# Patient Record
Sex: FEMALE | Race: OTHER | HISPANIC OR LATINO | URBAN - METROPOLITAN AREA
[De-identification: names, ages, dates, MRNs, and addresses within clinical notes are randomized per-mention and may not be internally consistent; named-entity substitution may affect disease eponyms.]

---

## 2017-07-08 ENCOUNTER — EMERGENCY (EMERGENCY)
Facility: HOSPITAL | Age: 22
LOS: 1 days | Discharge: HOME | End: 2017-07-08
Attending: EMERGENCY MEDICINE | Admitting: EMERGENCY MEDICINE
Payer: SELF-PAY

## 2017-07-08 VITALS
RESPIRATION RATE: 18 BRPM | TEMPERATURE: 98 F | OXYGEN SATURATION: 98 % | DIASTOLIC BLOOD PRESSURE: 78 MMHG | HEART RATE: 85 BPM | SYSTOLIC BLOOD PRESSURE: 111 MMHG

## 2017-07-08 PROCEDURE — 99282 EMERGENCY DEPT VISIT SF MDM: CPT | Mod: 25

## 2017-07-08 PROCEDURE — 12001 RPR S/N/AX/GEN/TRNK 2.5CM/<: CPT

## 2017-07-08 PROCEDURE — 99283 EMERGENCY DEPT VISIT LOW MDM: CPT | Mod: 25

## 2017-07-08 NOTE — ED PROVIDER NOTE - OBJECTIVE STATEMENT
The patient reports that today she was at a wedding when a chandalier fell, and she subsequently fell upon some of the broken glass. At that time she felt no pain, but reports that after she stood up she realized she was bleeding down her left leg. She went to clean herself in a restroom and noticed that she had a laceration of her left buttock. She was able to get the bleeding to stop, but notes that friends of hers at the wedding had activated EMS and due to concern for worsened injury she was brought here to the hospital. Here in the ED she denies having pain, noting she is mostly embarrassed and concerned that she "may need stitches". She denies any head trauma, loss of consciousness, heart palpitations, numbness, tingling, weakness, personal or family history of bleeding disorder, and denies significant ongoing pain. The patient reports that today she was at a wedding when a chandelier fell, and she subsequently fell upon some of the broken glass. At that time she felt no pain, but reports that after she stood up she realized she was bleeding down her left leg. She went to clean herself in a restroom and noticed that she had a laceration of her left buttock. She was able to get the bleeding to stop, but notes that friends of hers at the wedding had activated EMS and due to concern for worsened injury she was brought here to the hospital. Here in the ED she denies having pain, noting she is mostly embarrassed and concerned that she "may need stitches". She denies any head trauma, loss of consciousness, heart palpitations, numbness, tingling, weakness, personal or family history of bleeding disorder, and denies significant ongoing pain.

## 2017-07-08 NOTE — ED PROCEDURE NOTE - PROCEDURE ADDITIONAL DETAILS
Wound was anesthetized and explored, linear ~2mm depth, no FB, minimal bleeding, no changes in sensation. Pt instructed to keep the area clean and dry. Instructed to not shower/bathe the area for 48 hours. Sutures out in 10 days at PCP or return here for removal. Patient repeated understanding.

## 2017-07-08 NOTE — ED PROVIDER NOTE - MEDICAL DECISION MAKING DETAILS
This patient is a 20 yo female presenting with linear laceration ~2cm to the left buttock. Upon inspection the wound did seem superficial, and when pressure was applied to the wound the patient did not have significant pain. The would was anesthetized, irrigated, and explored (more detail as per procedure note) with no findings of foreign bodies/glass, and a depth of ~2mm. At this time I do not suspect there is a retained foreign body and the patients laceration was repaired via primary closure using 5 simple interrupted ethilon 5-0 sutures. The patient is to have these sutures out in 10 days. She is to return or seek care if she develops any signs of infection. These signs were discussed with her to include fevers, chills, increasing pain, purulent drainage, red streaking, or other new symptoms of concern. The patient reports that she is comfortable with this plan of care. 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 This patient is a 20 yo female presenting with linear laceration ~2cm to the left buttock. Upon inspection the wound did seem superficial, and when pressure was applied to the wound the patient did not have significant pain. The would was anesthetized, irrigated, and explored (more detail as per procedure note) with no findings of foreign bodies/glass, and a depth of ~2mm. At this time I do not suspect there is a retained foreign body and the patients laceration was repaired via primary closure using 5 simple interrupted ethilon 5-0 sutures. The patient is to have these sutures out in 10 days. She is to return or seek care if she develops any signs of infection. 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 Lac repair, reassess

## 2017-07-08 NOTE — ED PROCEDURE NOTE - PROCEDURE DATE TIME, MLM
MRN:[id number]                      After Visit Summary   2/14/2017    Yarelis Leiva    MRN: [id number]           Visit Information        Provider Department      2/14/2017 9:30 AM Denisse Farida Rodríguezan [facility]      Your next 10 appointments already scheduled     Feb 14, 2017  3:00 PM CST   Office Visit with Deepti Ramirez MD   [facility] ([facility])    [address] [facility] [id number] [phone]           Bring a current list of meds and any records pertaining to this visit.  For Physicals, please bring immunization records and any forms needing to be filled out.  Please arrive 10 minutes early to complete paperwork.            Mar 02, 2017  9:30 AM CST   [facility]   [facility] ([facility])    [address]  [facility] [id number] [phone]              MyChart Information     [facility] gives you secure access to your electronic health record. If you see a primary care provider, you can also send messages to your care team and make appointments. If you have questions, please call your primary care clinic.  If you do not have a primary care provider, please call [phone] and they will assist you.        Care EveryWhere ID     This is your Care EveryWhere ID. This could be used by other organizations to access your [facility] medical records  [id number]        
08-Jul-2017 20:47

## 2017-07-08 NOTE — ED PROVIDER NOTE - CHIEF COMPLAINT
The patient is a 21y Female complaining of The patient is a 21y Female BIB EMS complaining of a laceration to the left buttock.

## 2017-07-08 NOTE — ED PROVIDER NOTE - PLAN OF CARE
Today you were seen for a laceration repair. Five, simple interupted sutures were placed into your laceration and will need to be removed in 10 days. This can take place at your primary care physicians office, urgent care centers, or at this emergency department.  Keep the area clean and dry. Do not shower for 48 hours. If you have returned bleeding or the wound appears red, has bad odors, or other signs of infection (i.e. you develop fevers, have worsened pain), you should be seen by should follow up in an emergency department to investigate for infection. Please follow up with your Primary MD in 24-48 hr. Seek immediate medical care for any new/worsening signs or symptoms.

## 2017-07-08 NOTE — ED PROVIDER NOTE - ATTENDING CONTRIBUTION TO CARE
This patient is a 20 yo female presenting with linear laceration ~2cm to the left buttock. Upon inspection the wound did seem superficial, and when pressure was applied to the wound the patient did not have significant pain. Lac repair, reassess

## 2017-07-08 NOTE — ED PROVIDER NOTE - CARE PLAN
Principal Discharge DX:	Laceration of buttock, left, initial encounter Principal Discharge DX:	Laceration of buttock, left, initial encounter  Instructions for follow-up, activity and diet:	Today you were seen for a laceration repair. Five, simple interupted sutures were placed into your laceration and will need to be removed in 10 days. This can take place at your primary care physicians office, urgent care centers, or at this emergency department.  Keep the area clean and dry. Do not shower for 48 hours. If you have returned bleeding or the wound appears red, has bad odors, or other signs of infection (i.e. you develop fevers, have worsened pain), you should be seen by should follow up in an emergency department to investigate for infection. Please follow up with your Primary MD in 24-48 hr. Seek immediate medical care for any new/worsening signs or symptoms.
